# Patient Record
Sex: MALE | Race: WHITE | URBAN - METROPOLITAN AREA
[De-identification: names, ages, dates, MRNs, and addresses within clinical notes are randomized per-mention and may not be internally consistent; named-entity substitution may affect disease eponyms.]

---

## 2018-05-16 ENCOUNTER — OFFICE VISIT (OUTPATIENT)
Dept: INTERNAL MEDICINE CLINIC | Facility: CLINIC | Age: 23
End: 2018-05-16

## 2018-05-16 VITALS
BODY MASS INDEX: 25.2 KG/M2 | TEMPERATURE: 98 F | WEIGHT: 180 LBS | OXYGEN SATURATION: 97 % | HEIGHT: 71 IN | DIASTOLIC BLOOD PRESSURE: 62 MMHG | RESPIRATION RATE: 16 BRPM | HEART RATE: 49 BPM | SYSTOLIC BLOOD PRESSURE: 104 MMHG

## 2018-05-16 DIAGNOSIS — R10.13 DYSPEPSIA: ICD-10-CM

## 2018-05-16 DIAGNOSIS — G89.29 CHRONIC LEFT-SIDED LOW BACK PAIN WITHOUT SCIATICA: ICD-10-CM

## 2018-05-16 DIAGNOSIS — Z00.00 WELL ADULT EXAM: Primary | ICD-10-CM

## 2018-05-16 DIAGNOSIS — M54.50 CHRONIC LEFT-SIDED LOW BACK PAIN WITHOUT SCIATICA: ICD-10-CM

## 2018-05-16 NOTE — PROGRESS NOTES
Marybeth Severs  Identified pt with two pt identifiers(name and ). Chief Complaint   Patient presents with   [facility].     Room 2A// [facility] Dr Ck Randall (years ago) // Fasting     Chest Pain     Usually on  \"after drinking over the weekend\"    Back Pain     intermittent, usually on the L side. Goes to the gym alot. NO urinary sx's. 1. Have you been to the ER, urgent care clinic since your last visit? Hospitalized since your last visit? NO    2. Have you seen or consulted any other health care providers outside of the [address] [place] [address] since your last visit? Include any pap smears or colon screening. NO      Dr Tyler Smith notified of reason for visit, vitals and flowsheets obtained on patients. Patient received paperwork for advance directive during previous visit but has not completed at this time     Reviewed record In preparation for visit, huddled with provider and have obtained necessary documentation      Health Maintenance Due   Topic    DTaP/Tdap/Td series (1 - Tdap)       Wt Readings from Last 3 Encounters:   18 180 lb (81.6 kg)     Temp Readings from Last 3 Encounters:   18 98 °F (36.7 °C) (Oral)     BP Readings from Last 3 Encounters:   18 104/62     Pulse Readings from Last 3 Encounters:   18 (!) 49     Vitals:    18 1049   BP: 104/62   Pulse: (!) 49   Resp: 16   Temp: 98 °F (36.7 °C)   TempSrc: Oral   SpO2: 97%   Weight: 180 lb (81.6 kg)   Height: 5' 10.87\" (1.8 m)   PainSc:   0 - No pain         Learning Assessment:  :     No flowsheet data found. Depression Screening:  :     No flowsheet data found. Fall Risk Assessment:  :     No flowsheet data found. Abuse Screening:  :     No flowsheet data found. ADL Screening:  :     No flowsheet data found. I have received verbal consent from Marybeth Severs to discuss any/all medical information while they are present in the room.     Medication reconciliation up to date and corrected with patient at this time.

## 2018-05-16 NOTE — MR AVS SNAPSHOT
[address] [address] [phone] Patient: Marsha Gamino MRN: [id number] :1995 Visit Information Date & Time Provider Department Dept. Phone Encounter #  
 2018 11:00 AM MD [facility] of [address] [place] [address] [id number] Follow-up Instructions Return in about 1 year (around 2019) for well exam. Upcoming Health Maintenance Date Due DTaP/Tdap/Td series (1 - Tdap) 10/5/2014 Influenza Age 5 to Adult 2018 Allergies as of 2018  Review Complete On: 2018 By: Cally Dorsey MD  
 No Known Allergies Current Immunizations  Never Reviewed No immunizations on file. Not reviewed this visit You Were Diagnosed With   
  
 Codes Comments Well adult exam    -  Primary ICD-10-CM: Z00.00 ICD-9-CM: V70.0 Vitals BP Pulse Temp Resp Height(growth percentile) Weight(growth percentile) 104/62 (BP 1 Location: Left arm, BP Patient Position: Sitting) (!) 49 98 °F (36.7 °C) (Oral) 16 5' 10.87\" (1.8 m) 180 lb (81.6 kg) SpO2 BMI Smoking Status 97% 25.2 kg/m2 Never Smoker Vitals History BMI and BSA Data Body Mass Index Body Surface Area  
 25.2 kg/m 2 2.02 m 2 Preferred Pharmacy Pharmacy Name Phone [facility] [address] [facility], [address] [place]  [phone] Your Updated Medication List  
  
Notice  As of 2018 11:47 AM  
 You have not been prescribed any medications. We Performed the Following CBC WITH AUTOMATED DIFF [84140 CPT(R)] LIPID PANEL [38799 CPT(R)] METABOLIC PANEL, COMPREHENSIVE [33271 CPT(R)] Follow-up Instructions Return in about 1 year (around 2019) for well exam.  
  
  
Introducing [facility]!    
 Sherry Chaney introduces [employer] patient portal. Now you can access parts of your medical record, email your doctor's office, and request medication refills online. 1. In your internet browser, go to [email or web address] 2. Click on the First Time User? Click Here link in the Sign In box. You will see the New Member Sign Up page. 3. Enter your [employer] Access Code exactly as it appears below. You will not need to use this code after youve completed the sign-up process. If you do not sign up before the expiration date, you must request a new code. · [employer] Access Code: [email or web address] Expires: 8/14/2018 10:49 AM 
 
4. Enter the last four digits of your Social Security Number (xxxx) and Date of Birth (mm/dd/yyyy) as indicated and click Submit. You will be taken to the next sign-up page. 5. Create a [employer] ID. This will be your [employer] login ID and cannot be changed, so think of one that is secure and easy to remember. 6. Create a [employer] password. You can change your password at any time. 7. Enter your Password Reset Question and Answer. This can be used at a later time if you forget your password. 8. Enter your e-mail address. You will receive e-mail notification when new information is available in [address] 9. Click Sign Up. You can now view and download portions of your medical record. 10. Click the Download Summary menu link to download a portable copy of your medical information. If you have questions, please visit the Frequently Asked Questions section of the [employer] website. Remember, [employer] is NOT to be used for urgent needs. For medical emergencies, dial 911. Now available from your iPhone and Android! Please provide this summary of care documentation to your next provider. Your primary care clinician is listed as Sheri Westbrook. If you have any questions after today's visit, please call [phone].

## 2018-05-16 NOTE — PROGRESS NOTES
HPI  Mr. Gino Nichols is a 23y.o. year old male, he is seen today as new patient to establish care. Complains of left sided chest pain near sternum, off and on, normally if had etoh the prior weekend and gone. No n/v/abd pain, no diaphoresis,   Not worse when goes to gym and exercise. No sob, no cough or wheezing. Works at [employer] -  - went to [employer], graduated 2016 - business economics. Was on baseball team.   Complains of left flank pain, intermittent, random, will feel like dull ache - started few months ago- no symptoms now - usually couple days after he lifts weight for back. No blood in urine, frequency or urgency  Did notice after hitting golf balls at driving range  Goes to gym 5-6 days per week - 1-1.5 hours of  weights and cardio. Chief Complaint   Patient [facility] with   [facility].     Room 2A// [facility] Dr Myles Prescott (years ago) // Fasting     Chest Pain     Usually on Mondays \"after drinking over the weekend\"    Back Pain     intermittent, usually on the L side. Goes to the gym alot. NO urinary sx's. Prior to Admission medications    Not on File         No Known Allergies      REVIEW OF SYSTEMS:  Per HPI    PHYSICAL EXAM:  Visit Vitals    /62 (BP 1 Location: Left arm, BP Patient Position: Sitting)    Pulse (!) 49    Temp 98 °F (36.7 °C) (Oral)    Resp 16    Ht 5' 10.87\" (1.8 m)    Wt 180 lb (81.6 kg)    SpO2 97%    BMI 25.2 kg/m2     Constitutional: Appears well-developed and well-nourished. No distress. HENT:   Head: Normocephalic and atraumatic. Eyes: No scleral icterus. PERRL  Mouth: OP clear without lesions, no pharyngeal exudate  Neck: no lad, no tm, supple   Cardiovascular: Normal S1/S2, regular rhythm. No murmurs, rubs, or gallops. Pulmonary/Chest: Effort normal and breath sounds normal. No respiratory distress. No wheezes, rhonchi, or rales.    Abdomen: Soft, NT/ND, +BS, no rebound or guarding, no masses, no HSM appreciated. Back: spine and muscles nontender to palpation, no CVA tenderness  Ext: No edema. 2+ pedal pulses b/l  Neurological: Alert. Psychiatric: Normal mood and affect. Behavior is normal.     No results found for: NA, K, CL, CO2, AGAP, GLU, BUN, CREA, BUCR, GFRAA, GFRNA, CA, TBIL, TBILI, GPT, SGOT, AP, TP, ALB, GLOB, AGRAT, ALT  No results found for: HBA1C, HGBE8, OUE4WTVU   No results found for: CHOL, CHOLPOCT, CHOLX, CHLST, CHOLV, HDL, HDLPOC, LDL, LDLCPOC, LDLC, DLDLP, VLDLC, VLDL, TGLX, TRIGL, TRIGP, TGLPOCT, CHHD, CHHDX       ASSESSMENT/PLAN  Diagnoses and all orders for this visit:    1. Well adult exam  -     LIPID PANEL  -     METABOLIC PANEL, COMPREHENSIVE  -     CBC WITH AUTOMATED DIFF  -     URINALYSIS W/ RFLX MICROSCOPIC    2. Chronic left-sided low back pain without sciatica  Check labs, urine, suspect msk in nature - return if worsens  3. Dyspepsia  Suspect chest pain from dyspepsia - zantac prn    Will request records from previous provider    Health Maintenance Due   Topic Date Due    DTaP/Tdap/Td series (1 - Tdap) 10/05/2014        Follow-up Disposition:  Return in about 1 year (around 5/16/2019) for well exam.       Reviewed plan of care. Patient has provided input and agrees with goals. The nurse provided the patient and/or family with advanced directive information if needed and encouraged the patient to provide a copy to the office when available.

## 2018-05-17 LAB
ALBUMIN SERPL-MCNC: 5 G/DL (ref 3.5–5.5)
ALBUMIN/GLOB SERPL: 1.9 {RATIO} (ref 1.2–2.2)
ALP SERPL-CCNC: 61 IU/L (ref 39–117)
ALT SERPL-CCNC: 26 IU/L (ref 0–44)
AST SERPL-CCNC: 34 IU/L (ref 0–40)
BASOPHILS # BLD AUTO: 0.1 X10E3/UL (ref 0–0.2)
BASOPHILS NFR BLD AUTO: 1 %
BILIRUB SERPL-MCNC: 0.8 MG/DL (ref 0–1.2)
BUN SERPL-MCNC: 16 MG/DL (ref 6–20)
BUN/CREAT SERPL: 13 (ref 9–20)
CALCIUM SERPL-MCNC: 9.6 MG/DL (ref 8.7–10.2)
CHLORIDE SERPL-SCNC: 99 MMOL/L (ref 96–106)
CHOLEST SERPL-MCNC: 176 MG/DL (ref 100–199)
CO2 SERPL-SCNC: 23 MMOL/L (ref 18–29)
CREAT SERPL-MCNC: 1.2 MG/DL (ref 0.76–1.27)
EOSINOPHIL # BLD AUTO: 0.2 X10E3/UL (ref 0–0.4)
EOSINOPHIL NFR BLD AUTO: 4 %
ERYTHROCYTE [DISTWIDTH] IN BLOOD BY AUTOMATED COUNT: 13.5 % (ref 12.3–15.4)
GFR SERPLBLD CREATININE-BSD FMLA CKD-EPI: 84 ML/MIN/1.73
GFR SERPLBLD CREATININE-BSD FMLA CKD-EPI: 97 ML/MIN/1.73
GLOBULIN SER CALC-MCNC: 2.6 G/DL (ref 1.5–4.5)
GLUCOSE SERPL-MCNC: 97 MG/DL (ref 65–99)
HCT VFR BLD AUTO: 46.9 % (ref 37.5–51)
HDLC SERPL-MCNC: 62 MG/DL
HGB BLD-MCNC: 15.6 G/DL (ref 13–17.7)
IMM GRANULOCYTES # BLD: 0 X10E3/UL (ref 0–0.1)
IMM GRANULOCYTES NFR BLD: 0 %
LDLC SERPL CALC-MCNC: 104 MG/DL (ref 0–99)
LYMPHOCYTES # BLD AUTO: 1.6 X10E3/UL (ref 0.7–3.1)
LYMPHOCYTES NFR BLD AUTO: 34 %
MCH RBC QN AUTO: 30.7 PG (ref 26.6–33)
MCHC RBC AUTO-ENTMCNC: 33.3 G/DL (ref 31.5–35.7)
MCV RBC AUTO: 92 FL (ref 79–97)
MONOCYTES # BLD AUTO: 0.4 X10E3/UL (ref 0.1–0.9)
MONOCYTES NFR BLD AUTO: 9 %
NEUTROPHILS # BLD AUTO: 2.6 X10E3/UL (ref 1.4–7)
NEUTROPHILS NFR BLD AUTO: 52 %
PLATELET # BLD AUTO: 231 X10E3/UL (ref 150–379)
POTASSIUM SERPL-SCNC: 4.4 MMOL/L (ref 3.5–5.2)
PROT SERPL-MCNC: 7.6 G/DL (ref 6–8.5)
RBC # BLD AUTO: 5.08 X10E6/UL (ref 4.14–5.8)
SODIUM SERPL-SCNC: 137 MMOL/L (ref 134–144)
TRIGL SERPL-MCNC: 52 MG/DL (ref 0–149)
VLDLC SERPL CALC-MCNC: 10 MG/DL (ref 5–40)
WBC # BLD AUTO: 4.9 X10E3/UL (ref 3.4–10.8)

## 2018-05-18 LAB
APPEARANCE UR: CLEAR
BILIRUB UR QL STRIP: NEGATIVE
COLOR UR: YELLOW
GLUCOSE UR QL: NEGATIVE
HGB UR QL STRIP: NEGATIVE
KETONES UR QL STRIP: NEGATIVE
LEUKOCYTE ESTERASE UR QL STRIP: NEGATIVE
MICRO URNS: NORMAL
NITRITE UR QL STRIP: NEGATIVE
PH UR STRIP: 6 [PH] (ref 5–7.5)
PROT UR QL STRIP: NEGATIVE
SP GR UR: 1.02 (ref 1–1.03)
UROBILINOGEN UR STRIP-MCNC: 0.2 MG/DL (ref 0.2–1)

## 2022-01-04 ENCOUNTER — VIRTUAL VISIT (OUTPATIENT)
Dept: INTERNAL MEDICINE CLINIC | Age: 27
End: 2022-01-04
Payer: COMMERCIAL

## 2022-01-04 DIAGNOSIS — R19.4 CHANGE IN BOWEL HABIT: ICD-10-CM

## 2022-01-04 DIAGNOSIS — K92.1 BLOOD IN STOOL: Primary | ICD-10-CM

## 2022-01-04 DIAGNOSIS — R51.9 HEADACHE ABOVE THE EYE REGION: ICD-10-CM

## 2022-01-04 PROCEDURE — 99203 OFFICE O/P NEW LOW 30 MIN: CPT | Performed by: INTERNAL MEDICINE

## 2022-01-04 NOTE — PROGRESS NOTES
Ranjit Newell  Identified pt with two pt identifiers(name and ). Chief Complaint   Patient presents with    Abdominal Pain    Rectal Injury    Headache       Reviewed record In preparation for visit and have obtained necessary documentation. 1. Have you been to the ER, urgent care clinic or hospitalized since your last visit? No     2. Have you seen or consulted any other health care providers outside of the [address] [place] [address] since your last visit? Include any pap smears or colon screening. No    Vitals reviewed with provider. Health Maintenance reviewed:     Health Maintenance Due   Topic    Hepatitis C Screening     DTaP/Tdap/Td series (2 - Td or Tdap)    Flu Vaccine (1)        Wt Readings from Last 3 Encounters:   18 180 lb (81.6 kg)      Temp Readings from Last 3 Encounters:   18 98 °F (36.7 °C) (Oral)      BP Readings from Last 3 Encounters:   18 104/62      Pulse Readings from Last 3 Encounters:   18 (!) 49      There were no vitals filed for this visit. Learning Assessment:   :     Learning Assessment 2022   PRIMARY LEARNER Patient   BARRIERS PRIMARY LEARNER NONE   CO-LEARNER CAREGIVER No   PRIMARY LANGUAGE ENGLISH   LEARNER PREFERENCE PRIMARY DEMONSTRATION   ANSWERED BY patient   RELATIONSHIP SELF        Depression Screening:   :     No flowsheet data found. Fall Risk Assessment:   :     No flowsheet data found. Abuse Screening:   :     No flowsheet data found.      ADL Screening:   :     ADL Assessment 2022   Feeding yourself No Help Needed   Getting from bed to chair No Help Needed   Getting dressed No Help Needed   Bathing or showering No Help Needed   Walk across the room (includes cane/walker) No Help Needed   Using the telphone No Help Needed   Taking your medications No Help Needed   Preparing meals No Help Needed   Managing money (expenses/bills) No Help Needed   Moderately strenuous housework (laundry) No Help Needed Shopping for personal items (toiletries/medicines) No Help Needed   Shopping for groceries No Help Needed   Driving No Help Needed   Climbing a flight of stairs No Help Needed   Getting to places beyond walking distances No Help Needed

## 2022-01-04 NOTE — PROGRESS NOTES
Julio Person is a 27 y.o. male who was seen by synchronous (real-time) audio-video technology on 1/4/2022 for Abdominal Pain, Rectal Injury, Headache, and Establish Care    This visit was completed virtually using [email or web address] me    Assessment & Plan:   Diagnoses and all orders for this visit:    1. Blood in stool  -     REFERRAL TO GASTROENTEROLOGY    2. Change in bowel habit  -     REFERRAL TO GASTROENTEROLOGY    3. Headache above the eye region          Given change in bowels with blood mixed in stool will refer to gastroenterology. In the meantime he will add fiber supplement such as Metamucil. For his headaches I suspect they are due to his screen time at work. He will cut back on unnecessary screen time and try blue blocker glasses. In addition I recommend he get an eye exam.    Subjective:         Has had blood in stool in past few weeks. No weight loss  Having less frequent BM  Intermittent abdominal cramping on his sides as well    Father with h/o renal cell CA  No known FH of colon cancer  No h/o crohns or UC     Also feels he is not completely emptying his bowels for the past couple of weeks. No change to diet    Noticed blood mixed in stool few days ago  In past has had drops of blood in toilet  In addition has had HA whole life but lately has been getting headache behind his left eye and left side of his head. They always happen at the end of the workday if they occur. Does not get them on weekends. Sits in front of a very large screen that is bright all day at work. No more than 2 HA per week  Last headache was a few days ago. He took ibuprofen and rested and it resolved within 45 minutes.     No issues on weekends    Prior to Admission medications    Not on File         ROS  Per HPI  Objective:     Patient-Reported Vitals 1/4/2022   Patient-Reported Weight 227        [INSTRUCTIONS:  \"[x]\" Indicates a positive item  \"[]\" Indicates a negative item  -- DELETE ALL ITEMS NOT EXAMINED]    Constitutional: [x] Appears well-developed and well-nourished [x] No apparent distress      [] Abnormal -     Mental status: [x] Alert and awake  [x] Oriented to person/place/time [x] Able to follow commands    [] Abnormal -     Eyes:   EOM    [x]  Normal    [] Abnormal -   Sclera  [x]  Normal    [] Abnormal -          Discharge [x]  None visible   [] Abnormal -     HENT: [x] Normocephalic, atraumatic  [] Abnormal -   [x] Mouth/Throat: Mucous membranes are moist    External Ears [x] Normal  [] Abnormal -    Neck: [x] No visualized mass [] Abnormal -     Pulmonary/Chest: [x] Respiratory effort normal   [x] No visualized signs of difficulty breathing or respiratory distress        [] Abnormal -      Musculoskeletal:   [] Normal gait with no signs of ataxia         [x] Normal range of motion of neck        [] Abnormal -     Neurological:        [x] No Facial Asymmetry (Cranial nerve 7 motor function) (limited exam due to video visit)          [x] No gaze palsy        [] Abnormal -          Skin:        [x] No significant exanthematous lesions or discoloration noted on facial skin         [] Abnormal -            Psychiatric:       [x] Normal Affect [] Abnormal -        [x] No Hallucinations    Other pertinent observable physical exam findings:-        We discussed the expected course, resolution and complications of the diagnosis(es) in detail. Medication risks, benefits, costs, interactions, and alternatives were discussed as indicated. I advised him to contact the office if his condition worsens, changes or fails to improve as anticipated. He expressed understanding with the diagnosis(es) and plan. Harrison Or, was evaluated through a synchronous (real-time) audio-video encounter. The patient (or guardian if applicable) is aware that this is a billable service. Verbal consent to proceed has been obtained within the past 12 months.  The visit was conducted pursuant to the emergency declaration under the [address] and the National Emergencies Act, [facility] waiver authority and the [employer] and [employer] General Act. Patient identification was verified, and a caregiver was present when appropriate. The patient was located in a state where the provider was credentialed to provide care.       Daniel Blancas MD

## 2022-03-24 NOTE — PROGRESS NOTES
ASSESSMENT/PLAN:  Below is the assessment and plan developed based on review of pertinent history, physical exam, labs, studies, and medications. 1. Acute pain of right shoulder  -     XR SHOULDER RT AP/LAT MIN 2 V; Future  2. Superior glenoid labrum lesion of right shoulder, initial encounter  3. 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 After long discussion with the patient regarding his pain explained that his imaging does demonstrate some AC joint osteolysis but no fractures or dislocations. His symptoms and history are consistent with a SLAP lesion of the right shoulder. We discussed this pathology in detail. We then discussed potential management options including activity modifications, physical therapy, anti-inflammatories, injections, and/or MRI. Due to his young age we will try to avoid any type of intra-articular injections at this time. He is been taking over-the-counter NSAIDs. We discussed potential side effects of these including GI and renal issues. This point he would like to undergo formal physical therapy to work on his shoulder. He was given a prescription for this today. Additionally we called him in a Luv Rink Dosepak to his pharmacy. We will plan to see him back in approximately 4 weeks time if he is no better. At which we will order an MRI. He is happy with this plan. He is encouraged to call come back sooner with any other questions or concerns. SUBJECTIVE/OBJECTIVE:  Gayathri Gar (: 1995) is a 27 y.o. male, patient,here for evaluation of the Shoulder Pain (right shoulder)  . Patient presents today for evaluation of the right shoulder. Last week he started developing some dull aching pain in his right shoulder without any inciting event. He works out multiple times a week. He does not recall any specific moment that he had increasing pain.   He decreased his workouts over the weekend but Monday he awoke and had ongoing pain that was worsening. Localizes pain to the anterior lateral aspect of his shoulder. Denies any radicular symptoms. Denies any prior history of injuries or surgeries to this arm. He is noticed that at times it feels okay but at other times whenever he is lifting his arm away from his body or up near his head height he has increased pain. He has had no feelings of instability. No snapping or crunching in the shoulder. He has been taking anti-inflammatories but it discontinues to cause discomfort. He is left-hand-dominant. Physical Exam    Interval:  Well-nourished well-developed male. Alert and oriented. No acute distress. Pleasant on exam.  Normal affect and mood. Right upper extremity:  Skin is intact about the shoulder. No deformity or atrophy about the shoulder girdle. No ecchymosis, erythema, warmth, or signs of infection. He is nontender palpation of the clavicle, AC joint, acromion, spine scapula, deltoid, biceps muscle, long head of the bicep, triceps muscle, elbow, forearm, wrist hand or fingers. He has full and symmetric range of motion of the shoulder compared to the contralateral side. Forward flexion 180 degrees, abduction 170 degrees, external rotation 80 degrees, internal rotation to the mid to lower thoracic spine. He does have discomfort though with terminal forward flexion and abduction. Special testing no pain with [place] or empty can. He does have pain and weakness with [place] and Neer. 5 out of 5 strength with resisted forward flexion, abduction, internal or external rotation. Full motion and strength of the elbow, wrist and fingers. Palpable radial pulse. Hand and fingers are warm and well-perfused. Left upper extremity:  No obvious deformity or injury to the extremity. He is nontender palpation globally about the extremity. Motion is symmetric and full compared to the contralateral side. Good strength in all planes.   Hand and fingers are warm and well-perfused. Imaging:    3 views of the right shoulder show no evidence of fracture or dislocation. There are cystic changes within the in the clavicle consistent with osteolysis of the distal clavicle. No Known Allergies    No current outpatient medications on file. No current facility-administered medications for this visit. History reviewed. No pertinent past medical history. History reviewed. No pertinent surgical history. Family History   Problem Relation Age of Onset   [employer] Mult Sclerosis Mother     Cancer Father 40        kidney    No Known Problems Brother     ADD / ADHD Brother     Cancer Paternal Grandmother         breast    Cancer Paternal Grandfather        Social History     Socioeconomic History    Marital status: SINGLE     Spouse name: Not on file    Number of children: Not on file    Years of education: Not on file    Highest education level: Not on file   Occupational History    Not on file   Tobacco Use    Smoking status: Never Smoker    Smokeless tobacco: Never Used   Vaping Use    Vaping Use: Never used   Substance and Sexual Activity    Alcohol use: Yes     Comment: social    Drug use: No    Sexual activity: Never   Other Topics Concern    Not on file   Social History Narrative    Not on file     Social Determinants of Health     Financial Resource Strain:     Difficulty of Paying Living Expenses: Not on file   Food Insecurity:     Worried About Running Out of Food in the Last Year: Not on file    Blayne of Food in the Last Year: Not on file   Transportation Needs:     Lack of Transportation (Medical): Not on file    Lack of Transportation (Non-Medical):  Not on file   Physical Activity:     Days of Exercise per Week: Not on file    Minutes of Exercise per Session: Not on file   Stress:     Feeling of Stress : Not on file   Social Connections:     Frequency of Communication with Friends and Family: Not on file    Frequency of Social Gatherings with Friends and Family: Not on file    Attends [employer] Services: Not on file    Active Member of Clubs or Organizations: Not on file    Attends Club or Organization Meetings: Not on file    Marital Status: Not on file   Intimate Partner Violence:     Fear of Current or Ex-Partner: Not on file    Emotionally Abused: Not on file    Physically Abused: Not on file    Sexually Abused: Not on file   Housing Stability:     Unable to Pay for Housing in the Last Year: Not on file    Number of [place] in the Last Year: Not on file    Unstable Housing in the Last Year: Not on file       Review of Systems    No flowsheet data found. Vitals:  Ht 6' (1.829 m)   Wt 225 lb (102.1 kg)   BMI 30.52 kg/m²    Body mass index is 30.52 kg/m². An electronic signature was used to authenticate this note.   -- Joce Borjas MD

## 2022-03-25 ENCOUNTER — OFFICE VISIT (OUTPATIENT)
Dept: ORTHOPEDIC SURGERY | Age: 27
End: 2022-03-25
Payer: COMMERCIAL

## 2022-03-25 VITALS — BODY MASS INDEX: 30.48 KG/M2 | WEIGHT: 225 LBS | HEIGHT: 72 IN

## 2022-03-25 DIAGNOSIS — S43.431A SUPERIOR GLENOID LABRUM LESION OF RIGHT SHOULDER, INITIAL ENCOUNTER: ICD-10-CM

## 2022-03-25 DIAGNOSIS — M25.511 ACUTE PAIN OF RIGHT SHOULDER: Primary | ICD-10-CM

## 2022-03-25 DIAGNOSIS — M89.511 OSTEOLYSIS OF ACROMIAL END OF RIGHT CLAVICLE: ICD-10-CM

## 2022-03-25 PROCEDURE — 99204 OFFICE O/P NEW MOD 45 MIN: CPT | Performed by: ORTHOPAEDIC SURGERY

## 2022-03-25 RX ORDER — METHYLPREDNISOLONE 4 MG/1
4 TABLET ORAL
Qty: 1 DOSE PACK | Refills: 0 | Status: SHIPPED | OUTPATIENT
Start: 2022-03-25

## 2023-03-30 ENCOUNTER — HOSPITAL ENCOUNTER (EMERGENCY)
Age: 28
Discharge: HOME | End: 2023-03-30
Attending: EMERGENCY MEDICINE
Payer: COMMERCIAL

## 2023-03-30 ENCOUNTER — APPOINTMENT (OUTPATIENT)
Dept: GENERAL RADIOLOGY | Age: 28
End: 2023-03-30
Attending: PHYSICIAN ASSISTANT
Payer: COMMERCIAL

## 2023-03-30 ENCOUNTER — NURSE TRIAGE (OUTPATIENT)
Dept: OTHER | Facility: CLINIC | Age: 28
End: 2023-03-30

## 2023-03-30 VITALS
DIASTOLIC BLOOD PRESSURE: 72 MMHG | OXYGEN SATURATION: 98 % | BODY MASS INDEX: 28.46 KG/M2 | WEIGHT: 210.1 LBS | TEMPERATURE: 98 F | HEART RATE: 60 BPM | SYSTOLIC BLOOD PRESSURE: 133 MMHG | RESPIRATION RATE: 18 BRPM | HEIGHT: 72 IN

## 2023-03-30 DIAGNOSIS — R07.89 ATYPICAL CHEST PAIN: Primary | ICD-10-CM

## 2023-03-30 LAB
ALBUMIN SERPL-MCNC: 4.5 G/DL (ref 3.5–5)
ALBUMIN/GLOB SERPL: 1.5 (ref 1.1–2.2)
ALP SERPL-CCNC: 52 U/L (ref 45–117)
ALT SERPL-CCNC: 43 U/L (ref 12–78)
ANION GAP SERPL CALC-SCNC: 5 MMOL/L (ref 5–15)
AST SERPL-CCNC: 27 U/L (ref 15–37)
BASOPHILS # BLD: 0.1 K/UL (ref 0–0.1)
BASOPHILS NFR BLD: 1 % (ref 0–1)
BILIRUB SERPL-MCNC: 0.7 MG/DL (ref 0.2–1)
BUN SERPL-MCNC: 23 MG/DL (ref 6–20)
BUN/CREAT SERPL: 23 (ref 12–20)
CALCIUM SERPL-MCNC: 9.7 MG/DL (ref 8.5–10.1)
CHLORIDE SERPL-SCNC: 106 MMOL/L (ref 97–108)
CO2 SERPL-SCNC: 26 MMOL/L (ref 21–32)
COMMENT, HOLDF: NORMAL
CREAT SERPL-MCNC: 1 MG/DL (ref 0.7–1.3)
D DIMER PPP FEU-MCNC: 0.21 MG/L FEU (ref 0–0.65)
DIFFERENTIAL METHOD BLD: NORMAL
EOSINOPHIL # BLD: 0.2 K/UL (ref 0–0.4)
EOSINOPHIL NFR BLD: 3 % (ref 0–7)
ERYTHROCYTE [DISTWIDTH] IN BLOOD BY AUTOMATED COUNT: 12.2 % (ref 11.5–14.5)
GLOBULIN SER CALC-MCNC: 3 G/DL (ref 2–4)
GLUCOSE SERPL-MCNC: 99 MG/DL (ref 65–100)
HCT VFR BLD AUTO: 43.7 % (ref 36.6–50.3)
HGB BLD-MCNC: 14.8 G/DL (ref 12.1–17)
IMM GRANULOCYTES # BLD AUTO: 0 K/UL (ref 0–0.04)
IMM GRANULOCYTES NFR BLD AUTO: 0 % (ref 0–0.5)
LYMPHOCYTES # BLD: 2.2 K/UL (ref 0.8–3.5)
LYMPHOCYTES NFR BLD: 31 % (ref 12–49)
MCH RBC QN AUTO: 30.9 PG (ref 26–34)
MCHC RBC AUTO-ENTMCNC: 33.9 G/DL (ref 30–36.5)
MCV RBC AUTO: 91.2 FL (ref 80–99)
MONOCYTES # BLD: 0.5 K/UL (ref 0–1)
MONOCYTES NFR BLD: 8 % (ref 5–13)
NEUTS SEG # BLD: 4.1 K/UL (ref 1.8–8)
NEUTS SEG NFR BLD: 57 % (ref 32–75)
NRBC # BLD: 0 K/UL (ref 0–0.01)
NRBC BLD-RTO: 0 PER 100 WBC
PLATELET # BLD AUTO: 225 K/UL (ref 150–400)
PMV BLD AUTO: 9.2 FL (ref 8.9–12.9)
POTASSIUM SERPL-SCNC: 3.9 MMOL/L (ref 3.5–5.1)
PROT SERPL-MCNC: 7.5 G/DL (ref 6.4–8.2)
RBC # BLD AUTO: 4.79 M/UL (ref 4.1–5.7)
SAMPLES BEING HELD,HOLD: NORMAL
SODIUM SERPL-SCNC: 137 MMOL/L (ref 136–145)
TROPONIN I SERPL HS-MCNC: 3 NG/L (ref 0–57)
WBC # BLD AUTO: 7.1 K/UL (ref 4.1–11.1)

## 2023-03-30 PROCEDURE — 85025 COMPLETE CBC W/AUTO DIFF WBC: CPT

## 2023-03-30 PROCEDURE — 80053 COMPREHEN METABOLIC PANEL: CPT

## 2023-03-30 PROCEDURE — 71046 X-RAY EXAM CHEST 2 VIEWS: CPT

## 2023-03-30 PROCEDURE — 84484 ASSAY OF TROPONIN QUANT: CPT

## 2023-03-30 PROCEDURE — 85379 FIBRIN DEGRADATION QUANT: CPT

## 2023-03-30 PROCEDURE — 93005 ELECTROCARDIOGRAM TRACING: CPT

## 2023-03-30 PROCEDURE — 36415 COLL VENOUS BLD VENIPUNCTURE: CPT

## 2023-03-30 PROCEDURE — 99285 EMERGENCY DEPT VISIT HI MDM: CPT

## 2023-03-30 NOTE — ED TRIAGE NOTES
Patient ambulatory through triage with complaints of intermittent 6/10 no known hx of cardiac issues. Patient denies any n/v or chest pain at this time.

## 2023-03-30 NOTE — TELEPHONE ENCOUNTER
Location of patient: [address] schmitt from Sarita Kirk at [facility] with [employer]. Subjective: Caller states \"I am worried about the chest pain I am having and I am not sure what to do\"     Current Symptoms:  center-left sided chest pain (chest towards the left pec), almost like a cramping, no current chest pain, working out does not trigger his chest pain, random pain that has increased in frequency but pain has not increased in severity, is more winded than normal with Cardio, lifts weights at the gym - has some twinges at the gym but nothing he can put his finger on, no nausea, no sweating, no dizziness, chest tightness when he wakes - moving around makes this go away, chest pain events are very short (a few seconds - can feel like a sharp cramp), sometimes feel heart racing but thinks that this happens in response to the anxiety of chest pain as opposed to the chest pain being a result of arrhythmmia    ** Uses Marijuana    Onset: 1 month ago; worsening    Associated Symptoms: NA    Pain Severity: 6/10; aching and can sharpen as the pain rises    Temperature: denies     What has been tried:     LMP: NA Pregnant: NA    Recommended disposition: Go to ED Now    Care advice provided, patient verbalizes understanding; denies any other questions or concerns; instructed to call back for any new or worsening symptoms. Patient/caller agrees to proceed to nearest Emergency Department    Attention Provider: Thank you for allowing me to participate in the care of your patient. The patient was connected to triage in response to information provided to the [facility]. Please do not respond through this encounter as the response is not directed to a shared pool.       Reason for Disposition   [1] Chest pain (or \"angina\") comes and goes AND [2] is happening more often (increasing in frequency) or getting worse (increasing in severity) (Exception: chest pains that last only a few seconds)    Protocols used: Chest Pain-ADULT-AH

## 2023-03-30 NOTE — ED PROVIDER NOTES
32year old M presenting for CP. Pt notes that \"for a while\" he has had some piercing left sided CP. Occasional tightness. Notes symptoms for a few weeks, noticing it more in the last 2 weeks. Sometimes midline. Occurs daily. Intermittent. Pt notes that pain is random - not positional, pleuritic, exertional.  Brief, lasts for maybe 10 seconds. Pt notes some soreness in his back that he relates to the gym. Patient denies hx VTE, hemoptysis, unilateral leg pain/swelling. Admits to being under stress and \"on edge. \"  Pt did drive to [place] about 3 weeks ago, but notes that all his symptoms were going on before that. Sometimes may feel shortness of breath. PMHx: hemorrhoids  PSx: wisdom teeth  Social: + marijuana - daily. No tobacco.  Rare alcohol. No other drugs. Marketing. NKDA  No family hx early CAD    The history is provided by the patient. Chest Pain (Angina)   Pertinent negatives include no abdominal pain, no cough, no fever and no vomiting. History reviewed. No pertinent past medical history. No past surgical history on file.       Family History:   Problem Relation Age of Onset    Mult Sclerosis Mother     Cancer Father 40        kidney    No Known Problems Brother     ADD / ADHD Brother     Cancer Paternal Grandmother         breast    Cancer Paternal Grandfather        Social History     Socioeconomic History    Marital status: SINGLE     Spouse name: Not on file    Number of children: Not on file    Years of education: Not on file    Highest education level: Not on file   Occupational History    Not on file   Tobacco Use    Smoking status: Never    Smokeless tobacco: Never   Vaping Use    Vaping Use: Never used   Substance and Sexual Activity    Alcohol use: Yes     Comment: social    Drug use: No    Sexual activity: Never   Other Topics Concern    Not on file   Social History Narrative    Not on file     Social Determinants of Health     Financial Resource Strain: Not on file   Food Insecurity: Not on file   Transportation Needs: Not on file   Physical Activity: Not on file   Stress: Not on file   Social Connections: Not on file   Intimate Partner Violence: Not on file   Housing Stability: Not on file         ALLERGIES: Patient has no known allergies. Review of Systems   Constitutional:  Negative for fever. HENT:  Negative for facial swelling. Eyes:  Negative for visual disturbance. Respiratory:  Negative for cough. Cardiovascular:  Positive for chest pain. Gastrointestinal:  Negative for abdominal pain and vomiting. Genitourinary:  Negative for dysuria. Musculoskeletal:  Negative for neck stiffness. Skin:  Negative for wound. Neurological:  Negative for syncope. All other systems reviewed and are negative. Vitals:    03/30/23 1851   BP: (!) 153/84   Pulse: 69   Resp: 16   Temp: 97.4 °F (36.3 °C)   SpO2: 98%   Weight: 95.3 kg (210 lb 1.6 oz)   Height: 6' (1.829 m)            Physical Exam  Vitals and nursing note reviewed. Constitutional:       General: He is not in acute distress. Appearance: He is well-developed. Comments: Pleasant, well-appearing, no distress   HENT:      Head: Normocephalic and atraumatic. Right Ear: External ear normal.      Left Ear: External ear normal.   Eyes:      General: No scleral icterus. Conjunctiva/sclera: Conjunctivae normal.   Neck:      Trachea: No tracheal deviation. Cardiovascular:      Rate and Rhythm: Normal rate and regular rhythm. Heart sounds: Normal heart sounds. No murmur heard. No friction rub. No gallop. Pulmonary:      Effort: Pulmonary effort is normal. No respiratory distress. Breath sounds: Normal breath sounds. No stridor. No wheezing. Abdominal:      General: There is no distension. Palpations: Abdomen is soft. Musculoskeletal:         General: Normal range of motion. Cervical back: Neck supple. Skin:     General: Skin is warm and dry.    Neurological:      Mental Status: He is alert and oriented to person, place, and time. Psychiatric:         Behavior: Behavior normal.        Medical Decision Making  38-year-old male presenting to the ED for 2 months of left-sided chest pain, worse in the last 2 weeks. DDx is broad, includes muscular chest pain, costochondritis, ACS, PE, esophagitis/esophageal spasm, anxiety, etc.  Will check EKG, labs, x-ray or CTA based on results of D-dimer. Heart score 0. Labs, including dimer and troponin, reassuring. Discussed possible causes of pain with patient, recommended primary care and cardiology follow-up, return precautions given. Amount and/or Complexity of Data Reviewed  Independent Historian: parent     Details: mother at bedside  Labs: ordered. Radiology: ordered. ECG/medicine tests: ordered.       ED Course as of 03/30/23 2141   Thu Mar 30, 2023   1914 EKG: sinus bradycardia, sinus arrhythmia, RAD, rate 55, , QRS 86, Qtc 371   [KP]      ED Course User Index  [KP] Tarik Samuels, DO       Procedures

## 2023-03-31 LAB
ATRIAL RATE: 55 BPM
CALCULATED P AXIS, ECG09: 78 DEGREES
CALCULATED R AXIS, ECG10: 101 DEGREES
CALCULATED T AXIS, ECG11: 22 DEGREES
DIAGNOSIS, 93000: NORMAL
P-R INTERVAL, ECG05: 146 MS
Q-T INTERVAL, ECG07: 388 MS
QRS DURATION, ECG06: 86 MS
QTC CALCULATION (BEZET), ECG08: 371 MS
VENTRICULAR RATE, ECG03: 55 BPM

## 2024-02-28 ENCOUNTER — TELEPHONE (OUTPATIENT)
Facility: CLINIC | Age: 29
End: 2024-02-28

## 2024-02-28 NOTE — TELEPHONE ENCOUNTER
The call was sent to me via nurse triage due to the patient mentioning chest discomfort.    Patient confirmed that he does have off and on chest discomfort and would like an appointment to get it checked out. He stated that he has been seen at the ER for this in the past and had a full work up which came back with negative results. They did not send him to see a cardiologist and he has not been to see one. He stated that it is not urgent but just wants to get it checked out.     The call was transferred to [place] for scheduling.

## 2024-02-29 ENCOUNTER — OFFICE VISIT (OUTPATIENT)
Facility: CLINIC | Age: 29
End: 2024-02-29
Payer: COMMERCIAL

## 2024-02-29 VITALS
RESPIRATION RATE: 12 BRPM | HEIGHT: 72 IN | DIASTOLIC BLOOD PRESSURE: 63 MMHG | BODY MASS INDEX: 27.77 KG/M2 | HEART RATE: 57 BPM | OXYGEN SATURATION: 98 % | WEIGHT: 205 LBS | TEMPERATURE: 98.8 F | SYSTOLIC BLOOD PRESSURE: 107 MMHG

## 2024-02-29 DIAGNOSIS — R07.89 MUSCULOSKELETAL CHEST PAIN: ICD-10-CM

## 2024-02-29 DIAGNOSIS — K59.00 CONSTIPATION, UNSPECIFIED CONSTIPATION TYPE: Primary | ICD-10-CM

## 2024-02-29 DIAGNOSIS — M54.9 UPPER BACK PAIN ON LEFT SIDE: ICD-10-CM

## 2024-02-29 PROCEDURE — 99214 OFFICE O/P EST MOD 30 MIN: CPT | Performed by: INTERNAL MEDICINE

## 2024-02-29 SDOH — ECONOMIC STABILITY: INCOME INSECURITY: HOW HARD IS IT FOR YOU TO PAY FOR THE VERY BASICS LIKE FOOD, HOUSING, MEDICAL CARE, AND HEATING?: NOT HARD AT ALL

## 2024-02-29 SDOH — ECONOMIC STABILITY: FOOD INSECURITY: WITHIN THE PAST 12 MONTHS, THE FOOD YOU BOUGHT JUST DIDN'T LAST AND YOU DIDN'T HAVE MONEY TO GET MORE.: NEVER TRUE

## 2024-02-29 SDOH — ECONOMIC STABILITY: HOUSING INSECURITY
IN THE LAST 12 MONTHS, WAS THERE A TIME WHEN YOU DID NOT HAVE A STEADY PLACE TO SLEEP OR SLEPT IN A SHELTER (INCLUDING NOW)?: NO

## 2024-02-29 SDOH — ECONOMIC STABILITY: FOOD INSECURITY: WITHIN THE PAST 12 MONTHS, YOU WORRIED THAT YOUR FOOD WOULD RUN OUT BEFORE YOU GOT MONEY TO BUY MORE.: NEVER TRUE

## 2024-02-29 ASSESSMENT — PATIENT HEALTH QUESTIONNAIRE - PHQ9
SUM OF ALL RESPONSES TO PHQ QUESTIONS 1-9: 0
SUM OF ALL RESPONSES TO PHQ9 QUESTIONS 1 & 2: 0
1. LITTLE INTEREST OR PLEASURE IN DOING THINGS: 0
2. FEELING DOWN, DEPRESSED OR HOPELESS: 0
SUM OF ALL RESPONSES TO PHQ QUESTIONS 1-9: 0

## 2024-02-29 NOTE — PROGRESS NOTES
Stanley Sweet  Identified pt with two pt identifiers(name and ).     Chief Complaint   Patient presents with    Chest Pain       Reviewed record In preparation for visit and have obtained necessary documentation.     1. Have you been to the ER, urgent care clinic or hospitalized since your last visit? No     2. Have you seen or consulted any other health care providers outside of the [facility] since your last visit? Include any pap smears or colon screening. No    Vitals reviewed with provider.    Health Maintenance reviewed:     Health Maintenance Due   Topic    Hepatitis B vaccine (1 of 3 - 3-dose series)    Depression Screen     HIV screen     Varicella vaccine (2 of 2 - 13+ 2-dose series)    Hepatitis C screen     DTaP/Tdap/Td vaccine (2 - Td or Tdap)    Flu vaccine (1)    COVID-19 Vaccine ( -  season)          Wt Readings from Last 3 Encounters:   24 93 kg (205 lb)   22 102.1 kg (225 lb)        Temp Readings from Last 3 Encounters:   24 98.8 °F (37.1 °C) (Oral)        BP Readings from Last 3 Encounters:   24 107/63        Pulse Readings from Last 3 Encounters:   24 57      [unfilled]       Learning Assessment:   :         2024     1:30 PM   [facility] AMB LEARNING ASSESSMENT   Primary Learner Patient   level of education 4 YEARS OF COLLEGE   Barriers Factors NONE   co-learner caregiver No   Primary Language ENGLISH   Learning Preference DEMONSTRATION   Answered By patient   Relationship to Learner SELF         Fall Risk Assessment:   :          No data to display                   Abuse Screening:   :          No data to display                   ADL Screening:   :         2024     1:00 PM   ADL ASSESSMENT   Feeding yourself No Help Needed   Getting from bed to chair No Help Needed   Getting dressed No Help Needed   Bathing or showering No Help Needed   Walk across the room (includes cane/walker) No Help Needed   Using the telphone No Help Needed 
  Lab Results   Component Value Date/Time     03/30/2023 07:25 PM    K 3.9 03/30/2023 07:25 PM     03/30/2023 07:25 PM    CO2 26 03/30/2023 07:25 PM    BUN 23 03/30/2023 07:25 PM    GLOB 3.0 03/30/2023 07:25 PM    ALT 43 03/30/2023 07:25 PM     No results found for: \"HBA1C\"   No results found for: \"CHOL\", \"CHOLPOCT\", \"CHOLX\", \"CHLST\", \"CHOLV\", \"HDL\", \"HDLPOC\", \"HDLC\", \"LDL\", \"LDLC\", \"VLDLC\", \"VLDL\", \"TGLX\", \"TRIGL\"       ASSESSMENT/PLAN  Stanley was seen today for chest pain.    Diagnoses and all orders for this visit:    Constipation, unspecified constipation type    Upper back pain on left side    Musculoskeletal chest pain      Suspect patient has musculoskeletal pain in his upper back and anterior chest, sitting at the desk for 8 hours is likely contributing.  Recommend trying massage or therapeutic yoga.  In addition constipation may be contributing to some of his left upper abdominal/chest pain.  He will take Benefiber daily.  Reassured that I do not believe his symptoms are cardiac in nature.    Health Maintenance Due   Topic Date Due    Hepatitis B vaccine (1 of 3 - 3-dose series) Never done    Depression Screen  Never done    HIV screen  Never done    Varicella vaccine (2 of 2 - 13+ 2-dose series) 03/04/2010    Hepatitis C screen  Never done    DTaP/Tdap/Td vaccine (2 - Td or Tdap) 02/04/2020    Flu vaccine (1) Never done    COVID-19 Vaccine (2 - 2023-24 season) 09/01/2023               Reviewed plan of care. Patient has provided input and agrees with goals.     The nurse provided the patient and/or family with advanced directive information if needed and encouraged the patient to provide a copy to the office when available.